# Patient Record
Sex: MALE | Race: BLACK OR AFRICAN AMERICAN
[De-identification: names, ages, dates, MRNs, and addresses within clinical notes are randomized per-mention and may not be internally consistent; named-entity substitution may affect disease eponyms.]

---

## 2020-04-05 ENCOUNTER — HOSPITAL ENCOUNTER (OUTPATIENT)
Dept: HOSPITAL 88 - ER | Age: 70
Setting detail: OBSERVATION
LOS: 2 days | Discharge: HOME | End: 2020-04-07
Attending: INTERNAL MEDICINE | Admitting: INTERNAL MEDICINE
Payer: COMMERCIAL

## 2020-04-05 VITALS — SYSTOLIC BLOOD PRESSURE: 153 MMHG | DIASTOLIC BLOOD PRESSURE: 78 MMHG

## 2020-04-05 VITALS — SYSTOLIC BLOOD PRESSURE: 130 MMHG | DIASTOLIC BLOOD PRESSURE: 64 MMHG

## 2020-04-05 VITALS — DIASTOLIC BLOOD PRESSURE: 61 MMHG | SYSTOLIC BLOOD PRESSURE: 110 MMHG

## 2020-04-05 VITALS — WEIGHT: 215 LBS | HEIGHT: 71 IN | BODY MASS INDEX: 30.1 KG/M2

## 2020-04-05 VITALS — SYSTOLIC BLOOD PRESSURE: 146 MMHG | DIASTOLIC BLOOD PRESSURE: 78 MMHG

## 2020-04-05 VITALS — DIASTOLIC BLOOD PRESSURE: 60 MMHG | SYSTOLIC BLOOD PRESSURE: 116 MMHG

## 2020-04-05 VITALS — SYSTOLIC BLOOD PRESSURE: 116 MMHG | DIASTOLIC BLOOD PRESSURE: 60 MMHG

## 2020-04-05 DIAGNOSIS — E78.5: ICD-10-CM

## 2020-04-05 DIAGNOSIS — Z88.8: ICD-10-CM

## 2020-04-05 DIAGNOSIS — Z99.2: ICD-10-CM

## 2020-04-05 DIAGNOSIS — Z82.49: ICD-10-CM

## 2020-04-05 DIAGNOSIS — D63.1: ICD-10-CM

## 2020-04-05 DIAGNOSIS — K57.30: Primary | ICD-10-CM

## 2020-04-05 DIAGNOSIS — N18.6: ICD-10-CM

## 2020-04-05 DIAGNOSIS — D62: ICD-10-CM

## 2020-04-05 DIAGNOSIS — Z83.3: ICD-10-CM

## 2020-04-05 DIAGNOSIS — Z21: ICD-10-CM

## 2020-04-05 DIAGNOSIS — Z80.9: ICD-10-CM

## 2020-04-05 DIAGNOSIS — E66.9: ICD-10-CM

## 2020-04-05 DIAGNOSIS — D12.4: ICD-10-CM

## 2020-04-05 DIAGNOSIS — E11.22: ICD-10-CM

## 2020-04-05 DIAGNOSIS — I12.0: ICD-10-CM

## 2020-04-05 DIAGNOSIS — N25.81: ICD-10-CM

## 2020-04-05 DIAGNOSIS — Z91.041: ICD-10-CM

## 2020-04-05 DIAGNOSIS — Z79.84: ICD-10-CM

## 2020-04-05 DIAGNOSIS — K64.8: ICD-10-CM

## 2020-04-05 LAB
ALBUMIN SERPL-MCNC: 4.1 G/DL (ref 3.5–5)
ALBUMIN/GLOB SERPL: 1 {RATIO} (ref 0.8–2)
ALP SERPL-CCNC: 68 IU/L (ref 40–150)
ALT SERPL-CCNC: 21 IU/L (ref 0–55)
ANION GAP SERPL CALC-SCNC: 21.2 MMOL/L (ref 8–16)
BACTERIA URNS QL MICRO: (no result) /HPF
BASOPHILS # BLD AUTO: 0.1 10*3/UL (ref 0–0.1)
BASOPHILS NFR BLD AUTO: 0.8 % (ref 0–1)
BILIRUB UR QL: NEGATIVE
BUN SERPL-MCNC: 54 MG/DL (ref 7–26)
BUN/CREAT SERPL: 5 (ref 6–25)
CALCIUM SERPL-MCNC: 8.1 MG/DL (ref 8.4–10.2)
CHLORIDE SERPL-SCNC: 98 MMOL/L (ref 98–107)
CLARITY UR: CLEAR
CO2 SERPL-SCNC: 26 MMOL/L (ref 22–29)
COLOR UR: YELLOW
DEPRECATED APTT PLAS QN: 30.5 SECONDS (ref 23.8–35.5)
DEPRECATED INR PLAS: 0.96
DEPRECATED NEUTROPHILS # BLD AUTO: 4.1 10*3/UL (ref 2.1–6.9)
EGFRCR SERPLBLD CKD-EPI 2021: 6 ML/MIN (ref 60–?)
EOSINOPHIL # BLD AUTO: 0.5 10*3/UL (ref 0–0.4)
EOSINOPHIL NFR BLD AUTO: 5.1 % (ref 0–6)
EPI CELLS URNS QL MICRO: (no result) /LPF
ERYTHROCYTE [DISTWIDTH] IN CORD BLOOD: 14.6 % (ref 11.7–14.4)
GLOBULIN PLAS-MCNC: 4 G/DL (ref 2.3–3.5)
GLUCOSE SERPLBLD-MCNC: 121 MG/DL (ref 74–118)
HCT VFR BLD AUTO: 16.8 % (ref 38.2–49.6)
HCT VFR BLD AUTO: 23.3 % (ref 38.2–49.6)
HGB BLD-MCNC: 5.6 G/DL (ref 14–18)
HGB BLD-MCNC: 8 G/DL (ref 14–18)
KETONES UR QL STRIP.AUTO: NEGATIVE
LEUKOCYTE ESTERASE UR QL STRIP.AUTO: NEGATIVE
LYMPHOCYTES # BLD: 3.9 10*3/UL (ref 1–3.2)
LYMPHOCYTES NFR BLD AUTO: 40.2 % (ref 18–39.1)
MCH RBC QN AUTO: 36.2 PG (ref 28–32)
MCHC RBC AUTO-ENTMCNC: 34.3 G/DL (ref 31–35)
MCV RBC AUTO: 105.4 FL (ref 81–99)
MONOCYTES # BLD AUTO: 1 10*3/UL (ref 0.2–0.8)
MONOCYTES NFR BLD AUTO: 10.3 % (ref 4.4–11.3)
NEUTS SEG NFR BLD AUTO: 43.3 % (ref 38.7–80)
NITRITE UR QL STRIP.AUTO: NEGATIVE
PLATELET # BLD AUTO: 312 X10E3/UL (ref 140–360)
POTASSIUM SERPL-SCNC: 4.2 MMOL/L (ref 3.5–5.1)
PROT UR QL STRIP.AUTO: >=300
PROTHROMBIN TIME: 13.4 SECONDS (ref 11.9–14.5)
RBC # BLD AUTO: 2.21 X10E6/UL (ref 4.3–5.7)
SODIUM SERPL-SCNC: 141 MMOL/L (ref 136–145)
SP GR UR STRIP: 1.02 (ref 1.01–1.02)
UROBILINOGEN UR STRIP-MCNC: 0.2 MG/DL (ref 0.2–1)
WBC #/AREA URNS HPF: (no result) /HPF (ref 0–5)

## 2020-04-05 PROCEDURE — 97139 UNLISTED THERAPEUTIC PX: CPT

## 2020-04-05 PROCEDURE — 97530 THERAPEUTIC ACTIVITIES: CPT

## 2020-04-05 PROCEDURE — 36415 COLL VENOUS BLD VENIPUNCTURE: CPT

## 2020-04-05 PROCEDURE — 99001 SPECIMEN HANDLING PT-LAB: CPT

## 2020-04-05 PROCEDURE — 86900 BLOOD TYPING SEROLOGIC ABO: CPT

## 2020-04-05 PROCEDURE — 86850 RBC ANTIBODY SCREEN: CPT

## 2020-04-05 PROCEDURE — 85025 COMPLETE CBC W/AUTO DIFF WBC: CPT

## 2020-04-05 PROCEDURE — 88305 TISSUE EXAM BY PATHOLOGIST: CPT

## 2020-04-05 PROCEDURE — 86704 HEP B CORE ANTIBODY TOTAL: CPT

## 2020-04-05 PROCEDURE — 82948 REAGENT STRIP/BLOOD GLUCOSE: CPT

## 2020-04-05 PROCEDURE — 82270 OCCULT BLOOD FECES: CPT

## 2020-04-05 PROCEDURE — 86870 RBC ANTIBODY IDENTIFICATION: CPT

## 2020-04-05 PROCEDURE — 74176 CT ABD & PELVIS W/O CONTRAST: CPT

## 2020-04-05 PROCEDURE — 86905 BLOOD TYPING RBC ANTIGENS: CPT

## 2020-04-05 PROCEDURE — 86920 COMPATIBILITY TEST SPIN: CPT

## 2020-04-05 PROCEDURE — 99284 EMERGENCY DEPT VISIT MOD MDM: CPT

## 2020-04-05 PROCEDURE — 80053 COMPREHEN METABOLIC PANEL: CPT

## 2020-04-05 PROCEDURE — 97161 PT EVAL LOW COMPLEX 20 MIN: CPT

## 2020-04-05 PROCEDURE — 45384 COLONOSCOPY W/LESION REMOVAL: CPT

## 2020-04-05 PROCEDURE — 87340 HEPATITIS B SURFACE AG IA: CPT

## 2020-04-05 PROCEDURE — 85014 HEMATOCRIT: CPT

## 2020-04-05 PROCEDURE — 86706 HEP B SURFACE ANTIBODY: CPT

## 2020-04-05 PROCEDURE — 85730 THROMBOPLASTIN TIME PARTIAL: CPT

## 2020-04-05 PROCEDURE — 85610 PROTHROMBIN TIME: CPT

## 2020-04-05 PROCEDURE — 87086 URINE CULTURE/COLONY COUNT: CPT

## 2020-04-05 PROCEDURE — 81001 URINALYSIS AUTO W/SCOPE: CPT

## 2020-04-05 PROCEDURE — 86922 COMPATIBILITY TEST ANTIGLOB: CPT

## 2020-04-05 PROCEDURE — 85018 HEMOGLOBIN: CPT

## 2020-04-05 PROCEDURE — 83036 HEMOGLOBIN GLYCOSYLATED A1C: CPT

## 2020-04-05 PROCEDURE — 97116 GAIT TRAINING THERAPY: CPT

## 2020-04-05 PROCEDURE — 83735 ASSAY OF MAGNESIUM: CPT

## 2020-04-05 PROCEDURE — 86880 COOMBS TEST DIRECT: CPT

## 2020-04-05 PROCEDURE — 90935 HEMODIALYSIS ONE EVALUATION: CPT

## 2020-04-05 PROCEDURE — 80048 BASIC METABOLIC PNL TOTAL CA: CPT

## 2020-04-05 RX ADMIN — BISACODYL SCH MG: 5 TABLET, COATED ORAL at 21:45

## 2020-04-05 RX ADMIN — BISACODYL SCH MG: 5 TABLET, COATED ORAL at 19:55

## 2020-04-05 RX ADMIN — GEMFIBROZIL SCH MG: 600 TABLET, FILM COATED ORAL at 16:38

## 2020-04-05 RX ADMIN — INSULIN LISPRO SCH UNIT: 100 INJECTION, SOLUTION INTRAVENOUS; SUBCUTANEOUS at 11:30

## 2020-04-05 RX ADMIN — Medication SCH MG: at 10:46

## 2020-04-05 RX ADMIN — METOPROLOL TARTRATE SCH MG: 50 TABLET, FILM COATED ORAL at 18:26

## 2020-04-05 RX ADMIN — INSULIN LISPRO SCH UNIT: 100 INJECTION, SOLUTION INTRAVENOUS; SUBCUTANEOUS at 16:30

## 2020-04-05 RX ADMIN — BISACODYL SCH MG: 5 TABLET, COATED ORAL at 23:21

## 2020-04-05 RX ADMIN — INSULIN LISPRO SCH UNIT: 100 INJECTION, SOLUTION INTRAVENOUS; SUBCUTANEOUS at 20:20

## 2020-04-05 RX ADMIN — CINACALCET HYDROCHLORIDE SCH MG: 30 TABLET, COATED ORAL at 20:58

## 2020-04-05 NOTE — NUR
REPORT RECEIVED BY ONCOMING NURSE. NO DISTRESS NOTED. PT AAOX3. RESTING IN BED WATCHING 
TELEVISION. 1 UNIT OF PRBC INFUSING AT 120ML/HR VIA RIGHT FOREARM IV. NO SIGNS OF 
TRANSFUSION REACTION NOTED.

## 2020-04-05 NOTE — NUR
FOLLOWED UP WITH LABORATORY REGARDING THE 3 UNITS OF BLOOD ORDERED BY TIMUR PASCAL NP. STAFF 
IN LAB REPORTS THAT [facility] BLOOD [place] IDENTIFIED ANTIBODIES IN THE PATIENT'S BLOOD AND 
THAT WE ARE STILL WAITING ON THEM TO SEND THE BLOOD. PATIENT RESTING IN BED WATCHING 
TELEVISION. ACYANOTIC. NO DISTRESS NOTED. SIDERAILS UP X2. CALL LIGHT IN REACH. INSTRUCTED 
TO CALL FOR ASSISTANCE. VERBALIZED UNDERSTANIDNG.

## 2020-04-05 NOTE — XMS REPORT
Patient Summary Document

                             Created on: 2020



NASREEN FERNÁNDEZ

External Reference #: [id number]

: 1950

Sex: Male



Demographics







                          Address                   [address] [place]  [address]

 

                          Home Phone                [phone]

 

                          Preferred Language        Unknown

 

                          Marital Status            Unknown

 

                          M[employer]on Affiliation     Unknown

 

                          Race                      Unknown

 

                                        Additional Race(s)  

 

                          Ethnic Group              Unknown





Author







                          [facility]                    [facility]

 

                          [facility]

 

                          Address                   Unknown

 

                          Phone                     Unavailable







Care Team Providers







                    Care Team Member Name    Role                Phone

 

                    EMA DELUNA    Unavailable         Unavailable







Problems

This patient has no known problems.



Allergies, Adverse Reactions, Alerts

This patient has no known allergies or adverse reactions.



Medications

This patient has no known medications.



Results







           Test Description    Test Time    Test Comments    Text Results    Atomic Results    Result

 Comments

 

                CT ABDOMEN/PELVIS WO    2020 04:12:00                                                      

                                                    [place] [address]      Patient Name: NASREEN FERNÁNDEZ                              
    MR #: [id number]                     : 1950                        
          Age/Sex: 70/M  Acct #: [id number]                              Req 
#: [id number]  Adm Physician:                                                   
  Ordered by: GUNNAR DELUNA MD                            Report #: 
[id number]        Location: ER                                      Room/Bed:    
                
___________________________________________________________________________________________________
   Procedure: [id number] CT/CT ABDOMEN/PELVIS WO  Exam Date: 20            
               Exam Time: 400                                              
REPORT STATUS: Signed    CT Abdomen and Pelvis without contrast      INDICATION:
Bloody stool  oral contrast only    2020    Y      TECHNIQUE: Thin 
collimation axial images obtained from the diaphragm to the   level of the pubic
symphysis without nonionic intravenous contrast.       Dose reduction techniques
used: Automated exposure control, adjustment of the   mAs and/or kVp according 
to patient size, standardized low-dose protocol,   and/or iterative eli
nstruction technique.      RADIATION DOSE:        Total DLP: 754.73 mGy*cm      
 Estimated effective dose: (DLP x 0.015 x size factor) mSv        CTDIvol has 
been reviewed. It is below the limits set by the Radiation   Protocol Committee 
(RPC).      COMPARISON: None.       ABDOMEN FINDINGS:      Lung Bases: Air 
trapping suggestive of small airways disease. Calcifications of   the mitral 
valve. Mild eventration of the right diaphragm.      Liver: The right lobe 
measures 20 cm in length. The attenuation is normal. No   mass.      
Gallbladder: Present and contains multiple calcified gallstones. No gallbladder 
 wall thickening.  No ductal dilatation.      Pancreas: Mild fatty atrophy 
without mass or ductal dilatation.      Spleen: Normal size without mass.      
Adrenal Glands:    Left greater than right hypertrophy. No nodules.      
Kidneys:    Right: No renal calculus.  No cortical mass or hydronephrosis. Mild 
perinephric   inflammation.      Left:  No renal calculus.  No cortical mass or 
hydronephrosis. Mild perinephric   inflammation. Retroaortic left renal vein.   
  Lymph Nodes: No lymphadenopathy.      Aorta:  Normal in diameter with 
scattered calcifications.      PELVIS FINDINGS:       Bowel:    Stomach:  
Normal.   Small Bowel: Normal in caliber with normal wall thickness.   Large 
Bowel: Diverticulosis coli. No associated inflammation. No large bowel   
dilatation.   Appendix: Normal.      Bladder: Under distended with mild 
circumferential mural thickening.   Prostate: Mildly enlarged. There are 
calcifications of the vas deferens.      Ureters: No ureteral dilatation or 
calculus.      Peritoneum/retroperitoneum: No free fluid or fluid collection. 
Punctate   peritoneal calcification in the lateral left hemiabdomen.      Bones:
Sclerotic lesion on the ilial side of the right SI joint with narrow   margins 
suggestive of bone island.      IMPRESSION:      1.  Diverticulosis coli. No 
evidence for bowel obstruction or inflammation.   Normal appendix.      2.  
Bilateral perinephric inflammation may be secondary to lymphatic   congestion.  
   3.  Cholelithiasis.      4. Prostate hypertrophy and bladder wall thickening 
suggestive of outlet   obstruction.      5. Hepatomegaly.      6. Bilateral 
adrenal hyperplasia.      Signed by: Dr. Liss Salgado MD on 2020 4:21 
AM        Dictated By: LISS SALGADO MD  Electronically Signed By: LISS SALGADO MD on 20  Transcribed By: HARRY on 20       
COPY TO:   GUNNAR DELUNA MD

## 2020-04-05 NOTE — NUR
SPOKE TO  SANDRA BOOGIE NP OF DR. FREEMAN AT THIS TIME. NEW ORDERS RECEIVED FOR TYPE AND 
SCREEN, CONSULT FOR GI AND NEPHROLOGY.

## 2020-04-05 NOTE — NUR
ASSUMED CARE. PT AAOX3. ACYANOTIC. RESTING IN BED. NO DISTRESS NOTED. CALL LIGHT IN REACH. 
SIDERAILS UP X2.

## 2020-04-05 NOTE — DIAGNOSTIC IMAGING REPORT
CT Abdomen and Pelvis without contrast



INDICATION: Bloody stool ^oral contrast only

^[id number]

^0400

^Y



TECHNIQUE: Thin collimation axial images obtained from the diaphragm to the

level of the pubic symphysis without nonionic intravenous contrast. 



Dose reduction techniques used: Automated exposure control, adjustment of the

mAs and/or kVp according to patient size, standardized low-dose protocol,

and/or iterative reconstruction technique.



RADIATION DOSE:

     Total DLP: 754.73 mGy*cm

     Estimated effective dose: (DLP x 0.015 x size factor) mSv

     CTDIvol has been reviewed. It is below the limits set by the Radiation

Protocol Committee (RPC).



COMPARISON: None.

 

ABDOMEN FINDINGS:



Lung Bases: Air trapping suggestive of small airways disease. Calcifications of

the mitral valve. Mild eventration of the right diaphragm.



Liver: The right lobe measures 20 cm in length. The attenuation is normal. No

mass.



Gallbladder: Present and contains multiple calcified gallstones. No gallbladder

wall thickening.  No ductal dilatation.



Pancreas: Mild fatty atrophy without mass or ductal dilatation.



Spleen: Normal size without mass.



Adrenal Glands: 

Left greater than right hypertrophy. No nodules.



Kidneys: 

Right: No renal calculus.  No cortical mass or hydronephrosis. Mild perinephric

inflammation.



Left:  No renal calculus.  No cortical mass or hydronephrosis. Mild perinephric

inflammation. Retroaortic left renal vein.



Lymph Nodes: No lymphadenopathy.



Aorta:  Normal in diameter with scattered calcifications.



PELVIS FINDINGS: 



Bowel: 

Stomach:  Normal.

Small Bowel: Normal in caliber with normal wall thickness.

Large Bowel: Diverticulosis coli. No associated inflammation. No large bowel

dilatation.

Appendix: Normal.



Bladder: Under distended with mild circumferential mural thickening.

Prostate: Mildly enlarged. There are calcifications of the vas deferens.



Ureters: No ureteral dilatation or calculus.



Peritoneum/retroperitoneum: No free fluid or fluid collection. Punctate

peritoneal calcification in the lateral left hemiabdomen.



Bones: Sclerotic lesion on the ilial side of the right SI joint with narrow

margins suggestive of bone island.



IMPRESSION:



1.  Diverticulosis coli. No evidence for bowel obstruction or inflammation.

Normal appendix.



2.  Bilateral perinephric inflammation may be secondary to lymphatic

congestion.



3.  Cholelithiasis.



4. Prostate hypertrophy and bladder wall thickening suggestive of outlet

obstruction.



5. Hepatomegaly.



6. Bilateral adrenal hyperplasia.



Signed by: Dr. Ta Salgado MD on 4/5/2020 4:21 AM

## 2020-04-06 VITALS — SYSTOLIC BLOOD PRESSURE: 140 MMHG | DIASTOLIC BLOOD PRESSURE: 66 MMHG

## 2020-04-06 VITALS — SYSTOLIC BLOOD PRESSURE: 144 MMHG | DIASTOLIC BLOOD PRESSURE: 70 MMHG

## 2020-04-06 VITALS — DIASTOLIC BLOOD PRESSURE: 65 MMHG | SYSTOLIC BLOOD PRESSURE: 135 MMHG

## 2020-04-06 VITALS — SYSTOLIC BLOOD PRESSURE: 135 MMHG | DIASTOLIC BLOOD PRESSURE: 68 MMHG

## 2020-04-06 VITALS — SYSTOLIC BLOOD PRESSURE: 147 MMHG | DIASTOLIC BLOOD PRESSURE: 75 MMHG

## 2020-04-06 LAB
ANION GAP SERPL CALC-SCNC: 22.9 MMOL/L (ref 8–16)
BASOPHILS # BLD AUTO: 0.1 10*3/UL (ref 0–0.1)
BASOPHILS # BLD AUTO: 0.1 10*3/UL (ref 0–0.1)
BASOPHILS NFR BLD AUTO: 0.9 % (ref 0–1)
BASOPHILS NFR BLD AUTO: 0.9 % (ref 0–1)
BUN SERPL-MCNC: 68 MG/DL (ref 7–26)
BUN/CREAT SERPL: 6 (ref 6–25)
CALCIUM SERPL-MCNC: 7.9 MG/DL (ref 8.4–10.2)
CHLORIDE SERPL-SCNC: 102 MMOL/L (ref 98–107)
CO2 SERPL-SCNC: 18 MMOL/L (ref 22–29)
DEPRECATED NEUTROPHILS # BLD AUTO: 2.9 10*3/UL (ref 2.1–6.9)
DEPRECATED NEUTROPHILS # BLD AUTO: 4.5 10*3/UL (ref 2.1–6.9)
EGFRCR SERPLBLD CKD-EPI 2021: 5 ML/MIN (ref 60–?)
EOSINOPHIL # BLD AUTO: 0.3 10*3/UL (ref 0–0.4)
EOSINOPHIL # BLD AUTO: 0.4 10*3/UL (ref 0–0.4)
EOSINOPHIL NFR BLD AUTO: 5.3 % (ref 0–6)
EOSINOPHIL NFR BLD AUTO: 5.3 % (ref 0–6)
ERYTHROCYTE [DISTWIDTH] IN CORD BLOOD: 16.6 % (ref 11.7–14.4)
ERYTHROCYTE [DISTWIDTH] IN CORD BLOOD: 16.6 % (ref 11.7–14.4)
GLUCOSE SERPLBLD-MCNC: 137 MG/DL (ref 74–118)
HCT VFR BLD AUTO: 22.9 % (ref 38.2–49.6)
HCT VFR BLD AUTO: 24.9 % (ref 38.2–49.6)
HGB BLD-MCNC: 7.4 G/DL (ref 14–18)
HGB BLD-MCNC: 8.3 G/DL (ref 14–18)
LYMPHOCYTES # BLD: 1.9 10*3/UL (ref 1–3.2)
LYMPHOCYTES # BLD: 2 10*3/UL (ref 1–3.2)
LYMPHOCYTES NFR BLD AUTO: 25.3 % (ref 18–39.1)
LYMPHOCYTES NFR BLD AUTO: 34.1 % (ref 18–39.1)
MAGNESIUM SERPL-MCNC: 2.2 MG/DL (ref 1.3–2.1)
MCH RBC QN AUTO: 32.3 PG (ref 28–32)
MCH RBC QN AUTO: 33.3 PG (ref 28–32)
MCHC RBC AUTO-ENTMCNC: 32.3 G/DL (ref 31–35)
MCHC RBC AUTO-ENTMCNC: 33.3 G/DL (ref 31–35)
MCV RBC AUTO: 100 FL (ref 81–99)
MCV RBC AUTO: 100 FL (ref 81–99)
MONOCYTES # BLD AUTO: 0.6 10*3/UL (ref 0.2–0.8)
MONOCYTES # BLD AUTO: 0.6 10*3/UL (ref 0.2–0.8)
MONOCYTES NFR BLD AUTO: 8.2 % (ref 4.4–11.3)
MONOCYTES NFR BLD AUTO: 9.9 % (ref 4.4–11.3)
NEUTS SEG NFR BLD AUTO: 49.6 % (ref 38.7–80)
NEUTS SEG NFR BLD AUTO: 60 % (ref 38.7–80)
PLATELET # BLD AUTO: 206 X10E3/UL (ref 140–360)
PLATELET # BLD AUTO: 240 X10E3/UL (ref 140–360)
POTASSIUM SERPL-SCNC: 4.9 MMOL/L (ref 3.5–5.1)
RBC # BLD AUTO: 2.29 X10E6/UL (ref 4.3–5.7)
RBC # BLD AUTO: 2.49 X10E6/UL (ref 4.3–5.7)
SODIUM SERPL-SCNC: 138 MMOL/L (ref 136–145)

## 2020-04-06 RX ADMIN — INSULIN LISPRO SCH UNIT: 100 INJECTION, SOLUTION INTRAVENOUS; SUBCUTANEOUS at 07:30

## 2020-04-06 RX ADMIN — GEMFIBROZIL SCH MG: 600 TABLET, FILM COATED ORAL at 16:38

## 2020-04-06 RX ADMIN — HYDROCORTISONE ACETATE SCH MG: 25 SUPPOSITORY RECTAL at 23:14

## 2020-04-06 RX ADMIN — METOPROLOL TARTRATE SCH MG: 50 TABLET, FILM COATED ORAL at 08:43

## 2020-04-06 RX ADMIN — INSULIN LISPRO SCH UNIT: 100 INJECTION, SOLUTION INTRAVENOUS; SUBCUTANEOUS at 21:00

## 2020-04-06 RX ADMIN — EZETIMIBE SCH MG: 10 TABLET ORAL at 08:44

## 2020-04-06 RX ADMIN — GEMFIBROZIL SCH MG: 600 TABLET, FILM COATED ORAL at 07:30

## 2020-04-06 RX ADMIN — SODIUM CHLORIDE SCH MG: 900 INJECTION INTRAVENOUS at 08:42

## 2020-04-06 RX ADMIN — INSULIN LISPRO SCH UNIT: 100 INJECTION, SOLUTION INTRAVENOUS; SUBCUTANEOUS at 16:37

## 2020-04-06 RX ADMIN — Medication SCH MG: at 09:00

## 2020-04-06 RX ADMIN — CINACALCET HYDROCHLORIDE SCH MG: 30 TABLET, COATED ORAL at 23:14

## 2020-04-06 RX ADMIN — INSULIN LISPRO SCH UNIT: 100 INJECTION, SOLUTION INTRAVENOUS; SUBCUTANEOUS at 12:49

## 2020-04-06 RX ADMIN — METOPROLOL TARTRATE SCH MG: 50 TABLET, FILM COATED ORAL at 16:38

## 2020-04-06 NOTE — OPERATIVE REPORT
DATE OF PROCEDURE:  04/06/2020

 

SURGEON:  Fady Sow MD

 

PROCEDURE:  Colonoscopy with polypectomy.

 

INDICATION FOR COLONOSCOPY:  Rectal bleeding, anemia.

 

MEDICATIONS:  The patient was done under MAC.  Please see anesthesiologist's note.

 

PROCEDURE IN DETAIL:  With the patient in the left lateral decubitus position, flexible

fiberoptic Olympus colonoscope was inserted into the rectum with ease and advanced all

the way to the cecum.  The scope was then withdrawn slowly.  Mucosa overlying the cecum

appeared to be within normal limits.  Scattered diverticular disease was pretty much

noted throughout.  One polyp was hot biopsied from the descending colon.  The rest of

the descending sigmoid appeared to be within normal limits other than for diverticular

disease.  A minute nodule was noted in the distal rectum just above the dentate line

that was hot biopsied.  Some small internal hemorrhoids were noted in the retroflexed

position.  The scope was then straightened out, it was subsequently withdrawn.  The

patient tolerated the procedure well. 

 

IMPRESSION:  

1. Diverticulosis.

2. Descending colon polyp, hot biopsied.

3. Rectal nodule just above dentate line hot biopsied.

4. Internal hemorrhoids none actively bleeding.

 

PLAN:  Follow up histology.  Initiate high-fiber, low-fat diet.  Initiate high-fiber

supplement.  Start Anucort-HC suppositories one suppository b.i.d. x10 days and p.r.n.

The patient might benefit from a followup colonoscopy in 3 to 5 years. 

 

 

 

 

______________________________

Fady Sow MD

 

[facility]

D:  04/06/2020 11:34:19

T:  04/06/2020 11:52:15

Job #:  [id number]

 

cc:            Boris Roberto MD

## 2020-04-06 NOTE — CONSULTATION
DATE OF CONSULTATION:  04/06/2020  

 

HISTORY OF PRESENT ILLNESS:  A 70-year-old Afro-American gentleman, [place]

[place] of [place] city.  His nephrologist does not come here, came in with rectal bleeding,

underwent colonoscopy, found to have hemorrhoids, received packed RBC transfusion,

scheduled for dialysis today.  He does not smoke or drink.  He has a history of

end-stage renal disease, type 2 diabetes, hypertension, anemia, chronic kidney disease,

secondary hyperparathyroidism, has an AV fistula, prior medical hernia repair, has a

right knee surgery in the past. 

 

 

CANCELLED DICTATION

 

 

 

 

______________________________

MD [facility]

D:  04/06/2020 12:55:31

T:  04/06/2020 13:05:06

Job #:  [id number]

## 2020-04-06 NOTE — DISCHARGE SUMMARY
ADMISSION DIAGNOSES:  

1. Acute blood loss anemia, secondary to gastrointestinal bleed.

2. End-stage renal disease.

3. Hyperlipidemia.

4. Hypertension with end-stage renal disease.

5. Obesity with a BMI of 30.

6. Type 2 diabetes with end-stage disease.

 

DISCHARGE DIAGNOSES:  

1. Acute blood loss anemia, secondary to gastrointestinal bleed.

2. End-stage renal disease.

3. Hyperlipidemia.

4. Hypertension with end-stage renal disease.

5. Obesity with a BMI of 30.

6. Type 2 diabetes with end-stage disease.

7. Hemorrhoids.

 

HISTORY:  Type 2 diabetes, hypertension, hyperlipidemia, end-stage renal disease with

dialysis on Monday, Wednesday, and Friday. 

 

SURGICAL HISTORY:  Left arm AV fistula, right knee surgery x2, right hand surgery and

umbilical hernia repair. 

 

FAMILY HISTORY:  The patient's cousins and aunt have diabetes.  The patient's niece had

cancer. 

 

SOCIAL HISTORY:  Noncontributory.

 

HOSPITAL COURSE:  A 70-year-old male, admits with complaints of rectal bleeding since

Thursday.  The bleeding stopped Friday and Saturday, then started began on Sunday.  He

had associated nausea, but is tolerating diet.  He denies diarrhea, fever, vomiting, and

sick contacts.  On admission, the patient's hemoglobin was 8.  He was started on

Protonix.  GI was consulted later in the day.  Stool for blood was positive and the

patient's hemoglobin dropped to 5.6, 3 PRBCs were ordered and transfused, and the

patient was taken for colonoscopy.  The patient will discharge home with new

prescription for Anusol b.i.d. x10 days and then p.r.n.  Hemoglobin at the time of

discharge is 8.3.  He was also given prescriptions for iron and vitamin C.  The patient

understands discharge instructions and agrees to plan.  Vital signs are stable.  The

patient is afebrile. 

 

 

Dictated by Carisa Felder NP

 

______________________________

Boris Roberto MD

 

[facility]

D:  04/06/2020 16:36:03

T:  04/06/2020 19:06:22

Job #:  [id number]

## 2020-04-06 NOTE — NUR
PATIENT FROM ENDO. REPORT RECEIVED. VITAL SIGNS STABLE. NO S/S OF DISTRESS. SITTING ON SIDE 
OF BED. NO COMPLAINTS OF PAIN. ABDOMEN SOFT TO PALPATION. PASSING FLATUS. SEE CHART FOR 
DETAILS.

## 2020-04-06 NOTE — NUR
Patient undergoing hemodialysis that will be completed by 0100. JULIET Kelly (NP) notified and 
also informed of curfew hours. Order received for patient to be discharged in am (04/07).

## 2020-04-06 NOTE — NUR
Patient received lying in bed. AAO x 3. Patient had no complaints of pain. Respirations even 
and non-labored.  Safety measures in place. Patient instructed to call for assistance when 
needed. Call light within reach.

## 2020-04-07 VITALS — SYSTOLIC BLOOD PRESSURE: 145 MMHG | DIASTOLIC BLOOD PRESSURE: 71 MMHG

## 2020-04-07 VITALS — DIASTOLIC BLOOD PRESSURE: 60 MMHG | SYSTOLIC BLOOD PRESSURE: 127 MMHG

## 2020-04-07 VITALS — SYSTOLIC BLOOD PRESSURE: 127 MMHG | DIASTOLIC BLOOD PRESSURE: 60 MMHG

## 2020-04-07 LAB
ANION GAP SERPL CALC-SCNC: 18.2 MMOL/L (ref 8–16)
BASOPHILS # BLD AUTO: 0.1 10*3/UL (ref 0–0.1)
BASOPHILS NFR BLD AUTO: 0.6 % (ref 0–1)
BUN SERPL-MCNC: 67 MG/DL (ref 7–26)
BUN/CREAT SERPL: 6 (ref 6–25)
CALCIUM SERPL-MCNC: 7.7 MG/DL (ref 8.4–10.2)
CHLORIDE SERPL-SCNC: 105 MMOL/L (ref 98–107)
CO2 SERPL-SCNC: 21 MMOL/L (ref 22–29)
DEPRECATED NEUTROPHILS # BLD AUTO: 4.9 10*3/UL (ref 2.1–6.9)
EGFRCR SERPLBLD CKD-EPI 2021: 5 ML/MIN (ref 60–?)
EOSINOPHIL # BLD AUTO: 0.3 10*3/UL (ref 0–0.4)
EOSINOPHIL NFR BLD AUTO: 4.2 % (ref 0–6)
ERYTHROCYTE [DISTWIDTH] IN CORD BLOOD: 16.1 % (ref 11.7–14.4)
GLUCOSE SERPLBLD-MCNC: 112 MG/DL (ref 74–118)
HCT VFR BLD AUTO: 21.7 % (ref 38.2–49.6)
HGB BLD-MCNC: 7.4 G/DL (ref 14–18)
LYMPHOCYTES # BLD: 1.9 10*3/UL (ref 1–3.2)
LYMPHOCYTES NFR BLD AUTO: 24.7 % (ref 18–39.1)
MAGNESIUM SERPL-MCNC: 2.3 MG/DL (ref 1.3–2.1)
MCH RBC QN AUTO: 33.5 PG (ref 28–32)
MCHC RBC AUTO-ENTMCNC: 34.1 G/DL (ref 31–35)
MCV RBC AUTO: 98.2 FL (ref 81–99)
MONOCYTES # BLD AUTO: 0.6 10*3/UL (ref 0.2–0.8)
MONOCYTES NFR BLD AUTO: 8.2 % (ref 4.4–11.3)
NEUTS SEG NFR BLD AUTO: 61.9 % (ref 38.7–80)
PLATELET # BLD AUTO: 230 X10E3/UL (ref 140–360)
POTASSIUM SERPL-SCNC: 4.2 MMOL/L (ref 3.5–5.1)
RBC # BLD AUTO: 2.21 X10E6/UL (ref 4.3–5.7)
SODIUM SERPL-SCNC: 140 MMOL/L (ref 136–145)

## 2020-04-07 RX ADMIN — SODIUM CHLORIDE SCH MG: 900 INJECTION INTRAVENOUS at 08:05

## 2020-04-07 RX ADMIN — EZETIMIBE SCH MG: 10 TABLET ORAL at 08:06

## 2020-04-07 RX ADMIN — Medication SCH MG: at 08:14

## 2020-04-07 RX ADMIN — METOPROLOL TARTRATE SCH MG: 50 TABLET, FILM COATED ORAL at 08:06

## 2020-04-07 RX ADMIN — HYDROCORTISONE ACETATE SCH MG: 25 SUPPOSITORY RECTAL at 08:06

## 2020-04-07 RX ADMIN — GEMFIBROZIL SCH MG: 600 TABLET, FILM COATED ORAL at 08:05

## 2020-04-07 RX ADMIN — INSULIN LISPRO SCH UNIT: 100 INJECTION, SOLUTION INTRAVENOUS; SUBCUTANEOUS at 10:07

## 2020-04-07 NOTE — NUR
Pt discharged home at this time. 0 s/s acute distress noted at time of discharge. Pt 
received 3 prescriptions and received education on new medications. Pt verbalized 
understanding of new medications. Pt verbalized understanding of all discharge instructions. 
Denies any pain at this time of discharge.

## 2020-12-21 ENCOUNTER — HOSPITAL ENCOUNTER (EMERGENCY)
Dept: HOSPITAL 88 - ER | Age: 70
Discharge: HOME | End: 2020-12-21
Payer: MEDICARE

## 2020-12-21 VITALS — BODY MASS INDEX: 30.1 KG/M2 | WEIGHT: 215 LBS | HEIGHT: 71 IN

## 2020-12-21 VITALS — DIASTOLIC BLOOD PRESSURE: 85 MMHG | SYSTOLIC BLOOD PRESSURE: 158 MMHG

## 2020-12-21 DIAGNOSIS — Z99.2: ICD-10-CM

## 2020-12-21 DIAGNOSIS — M75.101: ICD-10-CM

## 2020-12-21 DIAGNOSIS — I10: ICD-10-CM

## 2020-12-21 DIAGNOSIS — E78.5: ICD-10-CM

## 2020-12-21 DIAGNOSIS — M25.511: Primary | ICD-10-CM

## 2020-12-21 DIAGNOSIS — E11.9: ICD-10-CM

## 2020-12-21 PROCEDURE — 99283 EMERGENCY DEPT VISIT LOW MDM: CPT

## 2020-12-30 ENCOUNTER — HOSPITAL ENCOUNTER (EMERGENCY)
Dept: HOSPITAL 88 - ER | Age: 70
LOS: 1 days | Discharge: HOME | End: 2020-12-31
Payer: MEDICARE

## 2020-12-30 VITALS — WEIGHT: 215 LBS | HEIGHT: 71 IN | BODY MASS INDEX: 30.1 KG/M2

## 2020-12-30 DIAGNOSIS — I12.0: ICD-10-CM

## 2020-12-30 DIAGNOSIS — R50.9: ICD-10-CM

## 2020-12-30 DIAGNOSIS — R05: ICD-10-CM

## 2020-12-30 DIAGNOSIS — Z99.2: ICD-10-CM

## 2020-12-30 DIAGNOSIS — E11.22: ICD-10-CM

## 2020-12-30 DIAGNOSIS — N18.6: ICD-10-CM

## 2020-12-30 DIAGNOSIS — U07.1: Primary | ICD-10-CM

## 2020-12-30 LAB
ALBUMIN SERPL-MCNC: 3.6 G/DL (ref 3.5–5)
ALBUMIN/GLOB SERPL: 0.7 {RATIO} (ref 0.8–2)
ALP SERPL-CCNC: 105 IU/L (ref 40–150)
ALT SERPL-CCNC: 14 IU/L (ref 0–55)
ANION GAP SERPL CALC-SCNC: 17.4 MMOL/L (ref 8–16)
BASOPHILS # BLD AUTO: 0 10*3/UL (ref 0–0.1)
BASOPHILS NFR BLD AUTO: 0.4 % (ref 0–1)
BUN SERPL-MCNC: 15 MG/DL (ref 7–26)
BUN/CREAT SERPL: 3 (ref 6–25)
CALCIUM SERPL-MCNC: 9 MG/DL (ref 8.4–10.2)
CHLORIDE SERPL-SCNC: 93 MMOL/L (ref 98–107)
CO2 SERPL-SCNC: 30 MMOL/L (ref 22–29)
DEPRECATED NEUTROPHILS # BLD AUTO: 3 10*3/UL (ref 2.1–6.9)
EGFRCR SERPLBLD CKD-EPI 2021: 14 ML/MIN (ref 60–?)
EOSINOPHIL # BLD AUTO: 0.1 10*3/UL (ref 0–0.4)
EOSINOPHIL NFR BLD AUTO: 1.3 % (ref 0–6)
ERYTHROCYTE [DISTWIDTH] IN CORD BLOOD: 14.2 % (ref 11.7–14.4)
GLOBULIN PLAS-MCNC: 4.9 G/DL (ref 2.3–3.5)
GLUCOSE SERPLBLD-MCNC: 118 MG/DL (ref 74–118)
HCT VFR BLD AUTO: 32.2 % (ref 38.2–49.6)
HGB BLD-MCNC: 10.6 G/DL (ref 14–18)
LYMPHOCYTES # BLD: 1 10*3/UL (ref 1–3.2)
LYMPHOCYTES NFR BLD AUTO: 21.4 % (ref 18–39.1)
MCH RBC QN AUTO: 33.7 PG (ref 28–32)
MCHC RBC AUTO-ENTMCNC: 32.9 G/DL (ref 31–35)
MCV RBC AUTO: 102.2 FL (ref 81–99)
MONOCYTES # BLD AUTO: 0.5 10*3/UL (ref 0.2–0.8)
MONOCYTES NFR BLD AUTO: 10 % (ref 4.4–11.3)
NEUTS SEG NFR BLD AUTO: 66.2 % (ref 38.7–80)
PLATELET # BLD AUTO: 192 X10E3/UL (ref 140–360)
POTASSIUM SERPL-SCNC: 4.4 MMOL/L (ref 3.5–5.1)
RBC # BLD AUTO: 3.15 X10E6/UL (ref 4.3–5.7)
SODIUM SERPL-SCNC: 136 MMOL/L (ref 136–145)

## 2020-12-30 PROCEDURE — 99283 EMERGENCY DEPT VISIT LOW MDM: CPT

## 2020-12-30 PROCEDURE — 71045 X-RAY EXAM CHEST 1 VIEW: CPT

## 2020-12-30 PROCEDURE — 93005 ELECTROCARDIOGRAM TRACING: CPT

## 2020-12-30 PROCEDURE — 36415 COLL VENOUS BLD VENIPUNCTURE: CPT

## 2020-12-30 PROCEDURE — 87040 BLOOD CULTURE FOR BACTERIA: CPT

## 2020-12-30 PROCEDURE — 85025 COMPLETE CBC W/AUTO DIFF WBC: CPT

## 2020-12-30 PROCEDURE — 80053 COMPREHEN METABOLIC PANEL: CPT

## 2020-12-30 PROCEDURE — 83605 ASSAY OF LACTIC ACID: CPT

## 2020-12-31 VITALS — DIASTOLIC BLOOD PRESSURE: 88 MMHG | SYSTOLIC BLOOD PRESSURE: 144 MMHG

## 2021-01-02 ENCOUNTER — HOSPITAL ENCOUNTER (EMERGENCY)
Dept: HOSPITAL 88 - ER | Age: 71
Discharge: HOME | End: 2021-01-02
Payer: MEDICARE

## 2021-01-02 VITALS — BODY MASS INDEX: 30.1 KG/M2 | HEIGHT: 71 IN | WEIGHT: 215 LBS

## 2021-01-02 DIAGNOSIS — Z79.4: ICD-10-CM

## 2021-01-02 DIAGNOSIS — I10: ICD-10-CM

## 2021-01-02 DIAGNOSIS — Z99.2: ICD-10-CM

## 2021-01-02 DIAGNOSIS — R50.9: ICD-10-CM

## 2021-01-02 DIAGNOSIS — R05: ICD-10-CM

## 2021-01-02 DIAGNOSIS — E11.9: ICD-10-CM

## 2021-01-02 DIAGNOSIS — R06.02: ICD-10-CM

## 2021-01-02 DIAGNOSIS — U07.1: Primary | ICD-10-CM

## 2021-01-02 PROCEDURE — 99282 EMERGENCY DEPT VISIT SF MDM: CPT

## 2022-06-08 NOTE — NUR
FOLLOWED UP WITH LABORATORY REGARDING PRBCS ORDERED.  INFORMS THAT BLOOD JUST 
ARRIVED FROM THE [facility] BLOOD [place] AND THAT IT WILL TAKE 45 MINUTES TO AN HOUR BEFORE 
IT IS READY FOR  FROM THE LAB.  INFORMS THAT HE WILL GIVE COURTESY CALL WHEN 
BLOOD IS READY FOR . PT RESTING IN BED WATCHING TELEVISION. ACYANOTIC. NO DISTRESS 
NOTED. CALL LIGHT IN REACH. SIDERAILS UP X2. BED LOW. Montelukast and Lisinopril on file.      LOV: 4/19/22